# Patient Record
Sex: FEMALE | Race: OTHER | NOT HISPANIC OR LATINO | URBAN - METROPOLITAN AREA
[De-identification: names, ages, dates, MRNs, and addresses within clinical notes are randomized per-mention and may not be internally consistent; named-entity substitution may affect disease eponyms.]

---

## 2023-03-12 ENCOUNTER — EMERGENCY (EMERGENCY)
Facility: HOSPITAL | Age: 38
LOS: 1 days | Discharge: HOME | End: 2023-03-12
Attending: EMERGENCY MEDICINE
Payer: SELF-PAY

## 2023-03-12 VITALS
HEART RATE: 83 BPM | OXYGEN SATURATION: 99 % | HEIGHT: 60 IN | WEIGHT: 199.96 LBS | RESPIRATION RATE: 18 BRPM | TEMPERATURE: 98 F | DIASTOLIC BLOOD PRESSURE: 68 MMHG | SYSTOLIC BLOOD PRESSURE: 110 MMHG

## 2023-03-12 PROCEDURE — 73562 X-RAY EXAM OF KNEE 3: CPT

## 2023-03-12 PROCEDURE — 99283 EMERGENCY DEPT VISIT LOW MDM: CPT

## 2023-03-12 PROCEDURE — 73562 X-RAY EXAM OF KNEE 3: CPT | Mod: 26,LT

## 2023-03-12 PROCEDURE — 99284 EMERGENCY DEPT VISIT MOD MDM: CPT

## 2023-03-12 NOTE — ED PROVIDER NOTE - CLINICAL SUMMARY MEDICAL DECISION MAKING FREE TEXT BOX
7-year-old female presents the emergency department with left knee pain after dancing last night.  A x-ray was obtained to rule out bony pathology and was negative for fracture or lesions.  Exam is concerning for ligamentous injury.  Patient was placed in a knee immobilizer and crutches and referred to orthopedics for further evaluation.  ED return precautions were discussed

## 2023-03-12 NOTE — ED PROVIDER NOTE - OBJECTIVE STATEMENT
37-year-old female presents to the emergency department complaining of left knee pain that started after going out dancing last night.  Patient reports it is painful to walk and she feels like the knee is going to give out.  She denies any falls however believes she injured the knee while dancing last night.

## 2023-03-12 NOTE — ED PROVIDER NOTE - ATTENDING APP SHARED VISIT CONTRIBUTION OF CARE
Perfecto DUNNEFR-20-wfee-old female with no medical problems presents with left knee pain last night.  Patient reportedly was dancing and post practicing, [employer] parade.  No direct fall or impact on the knee.  Patient is not able to bear weight and has to keep her knee off the floor and has been humping.  No other injuries or trauma.  Patient denies pregnancy.    Patient is alert well-appearing female sitting in the chair, S1-S2 normal regular, bilateral clear breath sounds, abdomen soft nontender, neuro exam alert oriented x3 no focal deficits, skin warm dry good turgor, left knee has slight warmth and is little diffusely tender to touch but has good range of motion with pain no ligamentous laxity noted when compared to the right knee patient's knee looks same it does not appear to have any swelling or swelling except for the 12 o'clock position there is mild swelling.    Plan to do x-ray of the left knee try NSAIDs patient advised that we will put her patient in knee immobilizer if x-ray negative will do RICE instructions ice pack applied to the knee will likely do Ortho follow-up.

## 2023-03-12 NOTE — ED PROVIDER NOTE - CARE PROVIDER_API CALL
Migel Lynn)  Orthopaedic Surgery  [address] [place] [address]  Phone: [phone]  Fax: [phone]  Follow Up Time:

## 2023-03-12 NOTE — ED ADULT TRIAGE NOTE - CHIEF COMPLAINT QUOTE
pt a+ox3, reports left knee pain. states she was dancing last night and knee gave out. left knee swollen.

## 2023-03-12 NOTE — ED PROVIDER NOTE - PHYSICAL EXAMINATION
LT Knee: No deformities, skin is intact, no ecchymosis, there is full range of motion of the left knee, no overlying erythema, there is some laxity of the LCL when stressed otherwise joint is stable, sensation is intact distally, dorsalis pedis is palpable.  There is no lower extremity edema or calf tenderness.

## 2023-03-12 NOTE — ED PROVIDER NOTE - NSFOLLOWUPINSTRUCTIONS_ED_ALL_ED_FT
Sprain    A sprain is a stretch or tear in one of the tough, fiber-like tissues (ligaments) in your body. This is caused by an injury to the area such as a twisting mechanism. Symptoms include pain, swelling, or bruising. Rest that area over the next several days and slowly resume activity when tolerated. Ice can help with swelling and pain.     SEEK IMMEDIATE MEDICAL CARE IF YOU HAVE ANY OF THE FOLLOWING SYMPTOMS: worsening pain, inability to move that body part, numbness or tingling.     Strain    A strain is a stretch or tear in one of the muscles in your body. This is caused by an injury to the area such as a twisting mechanism. Symptoms include pain, swelling, or bruising. Rest that area over the next several days and slowly resume activity when tolerated. Ice can help with swelling and pain.     SEEK IMMEDIATE MEDICAL CARE IF YOU HAVE ANY OF THE FOLLOWING SYMPTOMS: worsening pain, inability to move that body part, numbness or tingling.    Please follow up with orthopedics within one week

## 2023-03-12 NOTE — ED PROVIDER NOTE - PATIENT PORTAL LINK FT
You can access the FollowMyHealth Patient Portal offered by [facility] by registering at the following website: [email or web address]. By joining [facility]’s FollowMyHealth portal, you will also be able to view your health information using other applications (apps) compatible with our system.

## 2023-03-13 PROBLEM — Z00.00 ENCOUNTER FOR PREVENTIVE HEALTH EXAMINATION: Status: ACTIVE | Noted: 2023-03-13

## 2023-03-14 ENCOUNTER — NON-APPOINTMENT (OUTPATIENT)
Age: 38
End: 2023-03-14

## 2023-03-15 ENCOUNTER — APPOINTMENT (OUTPATIENT)
Dept: ORTHOPEDIC SURGERY | Facility: CLINIC | Age: 38
End: 2023-03-15
Payer: SELF-PAY

## 2023-03-15 ENCOUNTER — NON-APPOINTMENT (OUTPATIENT)
Age: 38
End: 2023-03-15

## 2023-03-15 VITALS — WEIGHT: 220 LBS | HEIGHT: 62 IN | BODY MASS INDEX: 40.48 KG/M2

## 2023-03-15 DIAGNOSIS — M25.562 PAIN IN LEFT KNEE: ICD-10-CM

## 2023-03-15 PROCEDURE — 99203 OFFICE O/P NEW LOW 30 MIN: CPT

## 2023-03-15 RX ORDER — IBUPROFEN 600 MG/1
600 TABLET, FILM COATED ORAL 3 TIMES DAILY
Qty: 60 | Refills: 3 | Status: ACTIVE | COMMUNITY
Start: 2023-03-15 | End: 1900-01-01

## 2023-03-15 NOTE — HISTORY OF PRESENT ILLNESS
[de-identified] : Pt is a 37y F who presents to the clinic today for Left Knee Pain. Pt reports over the weekend she was dancing at a [employer] parade where she reports of a twisting injury to her left knee followed by a mechanical fall onto the left side. She presented to [facility] ED where XRs were performed and were negative for fractures however positive for joint effusion. Pt denies numbness/tingling of the LLE. No other complaints at this time.  [3] : a current pain level of 3/10 [de-identified] : aching

## 2023-03-15 NOTE — PHYSICAL EXAM
[Antalgic] : antalgic [de-identified] : Physical Exam:\par General: Well appearing, no acute distress, A&O\par Neurologic: A&Ox3, No focal deficits\par Head: NCAT without abrasions, lacerations, or ecchymosis to head, face, or scalp\par Respiratory: Equal chest wall expansion bilaterally, no accessory muscle use\par Lymphatic: No lymphadenopathy palpated\par Skin: Warm and dry\par Psychiatric: Normal mood and affect [de-identified] : LLE: \par SILT L2-S1 \par EHL/FHL/TA/GSC intact\par Extensor mechanism intact\par No laxity with varus/valgus stress\par Lachman negative \par Post Drawer negative\par +mcmurrays \par +mild effusion of left knee\par DP/PT pulse 2+\par compartments soft and compressible \par  [de-identified] : XR from [facility] ED reviewed today: negative for fractures/dislocations, +effusion

## 2023-03-15 NOTE — ASSESSMENT
[FreeTextEntry1] : 37yF w/ Left Knee possible meniscus tear \par -based on imaging and clinical exam, pt likely has a mensicus tear\par -Rec WBAT LLE, knee immobilizer only as needed for comfort\par -Will write for PT, gait/strength training of the LLE\par -Rest, Ice, elevate LLE, NSAIDS as needed for pain and swelling\par -All questions answered and pt demonstrated understanding of current plan \par I, Reggie Sharma, have acted as a Resident and documented the above information for Dr. Lynn on 03/15/2023 . \par